# Patient Record
Sex: MALE | Race: WHITE
[De-identification: names, ages, dates, MRNs, and addresses within clinical notes are randomized per-mention and may not be internally consistent; named-entity substitution may affect disease eponyms.]

---

## 2019-01-31 NOTE — GHP
[f 
rep st]



                                                       PREOP HISTORY AND 
PHYSICAL 





He will be an a.m. admission for surgery on Wednesday, February 13, 2019.



PROBLEM:  Right hip severe degenerative arthritis.



HISTORY OF PRESENT ILLNESS:  The patient is a 53-year-old man admitted for a 
right hip [place] hip resurfacing arthroplasty.  He has had intermittent 
problems for the past 5 years.  He is now having daily pain and occasional 
night pain.  Walking is painful.  His activities are very limited.  He is using 
ibuprofen.  He has trouble putting on his shoes and socks on the right side.  
He has tried physical therapy, which is not helpful.  He has had 2 cortisone 
injections, which provided brief benefit.  He has also had some low back pain 
and SI joint problems.  He has seen Dr. Galvez at [facility] and Dr. Derick Corcoran 
in [place].  He is not a candidate for hip arthroscopy.  He is admitted for a 
right hip [place] hip resurfacing arthroplasty.



PAST MEDICAL HISTORY:  He has been treated for skin cancer, which was not 
melanoma.  No history of heart disease, stents, DVT, hepatitis, MRSA staph 
infections, sleep apnea, or bleeding problems.



CURRENT MEDICATIONS:  Advil.



DRUG ALLERGIES:  None.



METAL ALLERGY:  None.



LATEX ALLERGY:  None.



SOCIAL HISTORY:  The patient is .  He does not smoke cigarettes and 
occasionally drinks alcohol.  He works as .



FAMILY HISTORY:  Positive for heart disease.



PHYSICAL EXAMINATION:  VITAL SIGNS:  Height 6 feet 3 inches, weight 225 pounds, 
BMI 28.1.  EYES:  Conjunctivae and sclerae are clear.  Pupils are round and 
reactive.  MOUTH:  Good oral hygiene.  No loose teeth.  CHEST:  Clear.  HEART:  
Regular rhythm.  No murmurs.  EXTREMITIES:  Pertinent findings are limited to 
his right hip.  He has full hip extension and 100 degrees of flexion.  External 
rotation 30 degrees.  Internal rotation 0 degrees.  Abduction 30 degrees.  He 
has well-developed thigh and buttocks musculature.



IMAGING:  His films show degenerative arthritis of his right hip.



IMPRESSION ON ADMISSION:  Right hip degenerative arthritis.  He is prepared for 
a right total hip Radha hip resurfacing arthroplasty.  The surgery has 
been described to him, including the risks, complications, expectations and 
recovery time.  I have talked to him about the risk of femoral neck fracture, 
dislocation, infection and sciatic nerve injury.  We have discussed, on 2 
occasions, the issue of elevated metal ions in the blood and in the soft 
tissues around the hip joint.  He understands that he is very young for any 
type of hip arthroplasty and may require revision surgery in his lifetime.  All 
his questions have been answered and he consents to surgery.  



He does not have a primary care doctor.





Job #:  [id number]

MTDD

## 2019-02-13 ENCOUNTER — HOSPITAL ENCOUNTER (INPATIENT)
Dept: HOSPITAL 80 - FSGY | Age: 54
LOS: 1 days | Discharge: HOME | DRG: 470 | End: 2019-02-14
Attending: ORTHOPAEDIC SURGERY | Admitting: ORTHOPAEDIC SURGERY
Payer: COMMERCIAL

## 2019-02-13 DIAGNOSIS — R55: ICD-10-CM

## 2019-02-13 DIAGNOSIS — M16.11: Primary | ICD-10-CM

## 2019-02-13 DIAGNOSIS — Z85.828: ICD-10-CM

## 2019-02-13 PROCEDURE — C1713 ANCHOR/SCREW BN/BN,TIS/BN: HCPCS

## 2019-02-13 PROCEDURE — 0SU90BZ SUPPLEMENT RIGHT HIP JOINT WITH RESURFACING DEVICE, OPEN APPROACH: ICD-10-PCS | Performed by: ORTHOPAEDIC SURGERY

## 2019-02-13 RX ADMIN — FAMOTIDINE SCH MG: 20 TABLET, FILM COATED ORAL at 20:42

## 2019-02-13 RX ADMIN — ACETAMINOPHEN SCH MG: 325 TABLET ORAL at 23:35

## 2019-02-13 RX ADMIN — ACETAMINOPHEN SCH MG: 325 TABLET ORAL at 17:10

## 2019-02-13 RX ADMIN — KETOROLAC TROMETHAMINE SCH MG: 15 INJECTION, SOLUTION INTRAMUSCULAR; INTRAVENOUS at 18:04

## 2019-02-13 RX ADMIN — ASPIRIN SCH MG: 325 TABLET, FILM COATED ORAL at 20:42

## 2019-02-13 RX ADMIN — KETOROLAC TROMETHAMINE SCH: 15 INJECTION, SOLUTION INTRAMUSCULAR; INTRAVENOUS at 19:45

## 2019-02-13 RX ADMIN — DOCUSATE SODIUM AND SENNOSIDES SCH TAB: 50; 8.6 TABLET ORAL at 20:42

## 2019-02-13 RX ADMIN — ACETAMINOPHEN SCH: 325 TABLET ORAL at 19:44

## 2019-02-13 RX ADMIN — KETOROLAC TROMETHAMINE SCH MG: 15 INJECTION, SOLUTION INTRAMUSCULAR; INTRAVENOUS at 23:34

## 2019-02-13 NOTE — GOP
[f rep st]



                                                                OPERATIVE REPORT





DATE OF OPERATION:  02/13/2019



SURGEON:  Saúl Franco MD



ASSISTANT:  Miguel Aragon CFA and Art Weston PAC.



ANESTHESIA:  A combination of Marcaine, spinal, and IV sedation.



ANESTHESIOLOGIST:  Dr. Oswaldo Garcia.



PREOPERATIVE DIAGNOSIS:  Right hip severe degenerative arthritis.



POSTOPERATIVE DIAGNOSIS:  Right hip severe degenerative arthritis.



PROCEDURE PERFORMED:  02/13/2019 a right hip Hope hip resurfacing arthroplasty.



FINDINGS:  





DESCRIPTION OF PROCEDURE:  The patient was given 2 g of IV Ancef preoperatively within 60 minutes of 
surgery.  He also received 1000 mg of IV tranexamic acid.  He was placed on the operating room table 
and given spinal anesthesia with Marcaine by Dr. Garcia.  He was then placed supine and given IV indira
tion.  A Chaudhary catheter was not used.  He wore a compressive stocking and SCD on the nonoperative leg
.  He was rolled to the left lateral decubitus position.  An axillary roll was used, and all pressure
 points were carefully padded.  The position was secured with the pegboard table attachment.  I was c
areful to lock the pelvis in a vertical position.  His perineum was isolated with plastic adhesive dr bhagat.  His right hip and right lower extremity were prepped with ChloraPrep.  They were draped free u
sing sterile sheets, stockinette, and Ioban plastic drape.  He was a large, muscular man and made pos
itioning a bit difficult. 



The [employer] time-out was performed to verify the correct patient identity and the c
orrect surgical side and site.  The [place] time-out was also performed. 



I made a 7 inch straight oblique posterolateral hip skin incision.  The subcutaneous tissues were sha
rply divided, and hemostasis was obtained using electrocautery.  The fascia jean paul was identified and s
plit along the axis of its fibers.  I curved posteriorly and proximally, and split the fascia of the 
gluteus quan and bluntly split the muscle fibers in line with their orientation.  His sciatic nerv
e was identified and protected throughout the procedure.  The Charnley self-retaining retractor was i
nserted.  The external rotators and the posterior hip capsule were divided as separate layers at the 
base of the femoral neck, tagged, and reflected posteriorly.  His gluteus quan tendon was divided 
and tagged in order to improve exposure and release tension on the sciatic nerve.  His hip was disloc
ated posteriorly.  I used a sizing gauge to check the diameter of the neck and concluded that 52 mm w
as the proper head size.  I performed a circumferential capsulotomy.  I was able to retract the femor
al head anteriorly and superiorly, and hold it out of place with appropriate retractors.  He had very
 muscular buttocks and it made the exposure difficult.  The remnant of his labrum was excised.  His l
abrum was pretty much osteophyed.  He had a large circumferential osteophyte around his acetabulum.  
His acetabulum was reamed sequentially up to 58 mm.  I selected the Hope monoblock porous-coate
d acetabular component with an outside diameter of 58 mm.  This was firmly impacted and was a very ti
ght fit.  I was careful to determine proper inclination and anteversion.  I used the remnant of the t
ransverse acetabular ligament and other acetabular bony landmarks to help me determine proper cup lluvia
entation.  Large periacetabular osteophytes were removed with an osteotome and rongeur.  I was carefu
l to leave a good lip of bone and capsule extending beyond the anterior-inferior lip of the metal cup
. 



I then returned to preparation of the femoral head.  Using appropriate jigs and guides, I inserted a 
guide pin into the femoral head and neck.  I was careful to position it in such a way that there woul
d be no notching of the neck.  The large sterile metal goniometer was used to check the neck shaft an
gle.  I reamed over the guide pin and inserted the reaming guide.  I then used the cylindrical reamer
 down to the head and neck junction.  This was followed by the flat reamer and the chamfer reamer.  T
anna head was sized for 52 mm.  There was no impingement or damage to the neck.  He had some small ante
rior neck osteophytes, which I removed with a rongeur.  I drilled a small hole in the lesser trochant
er and inserted a suction cannula to create negative pressure in the medullary canal.  Small holes we
re drilled on the flattened chamfer surfaces of the prepared head for cement anchors.  The head was t
horoughly cleaned with the pulsating lavage and carefully dried.  I used a CarboJet device to blow dr
y the cancellous surfaces.  A single batch of Simplex cement with tobramycin was mixed.  At about 50 
seconds, I poured the liquid cement into the head component, inserted it onto the femoral head and im
pacted it into place.  Excess cement was removed before it hardened. 



The acetabulum was irrigated and cleaned and inspected, and the hip was reduced.  Stability and range
 of motion were checked.  I placed my finger along the anterior aspect of the acetabular component an
d flexed the hip to 110 degrees.  There was no anterior impingement.  The suction cannula on the less
er trochanter was removed.  The wound was thoroughly irrigated with a dilute Betadine solution.  40 c
c of the joint anesthetic cocktail were injected into the capsule, the deep musculature, and the subc
utaneous tissues along the skin edges.  50 cc of tranexamic acid solution was irrigated into the join
t. 



His sciatic nerve was reinspected and looked unharmed.  The external rotators and the posterior capsu
le were repaired in separate layers with two #2 FiberWire sutures through drill holes in the greater 
trochanter.  The gluteus quan tendon was repaired with two figure-of-eight #2 FiberWire sutures.  
The fascia was repaired first with 2 interrupted figure-of-eight #2 FiberWire sutures followed by a r
unning #2 barbed Ethicon Stratafix PDO suture.  The subcutaneous tissues were closed with a running 0
 barbed Ethicon Stratafix Monoderm suture.  The skin was closed with a running 3-0 barbed Ethicon Str
atafix Monoderm subcuticular suture.  The skin edges were reapproximated and sealed with Dermabond gl
ue.  The wound was covered with a large sterile Mepilex waterproof dressing.  The sacral Mepilex dres
sing was also applied. 



The estimated blood loss was about 500 mL. 



I used a [employer] Hope hip resurfacing system.  The acetabular component was 58 mm in d
iameter and press-fit.  The femoral head was 52 mm and cemented.  A long leg compressive stocking and
 SCD were applied to the operative leg.  An abduction pillow was placed between his knees.  He was aw
akened from anesthesia and rolled to the supine position on his [facility].  He was taken to PAC
U in satisfactory condition.  There were no recognized intraoperative complications. 



The sponge and needle count were correct on 2 occasions. 



Miguel Aragon and Delfino Weston acted as surgical assistants.  Their assistance was a medical necess
ity for safe completion of the procedure. 



The patient does not have a primary care doctor.





Job #:  [id number]

## 2019-02-13 NOTE — PDANEPAE
ANE History of Present Illness





here for R hip re-surfacing





ANE Past Medical History





- Cardiovascular History


Hx Hypertension: No


Hx Arrhythmias: No


Hx Chest Pain: No


Hx Coronary Artery / Peripheral Vascular Disease: No


Hx CHF / Valvular Disease: No


Hx Palpitations: No


Cardiovascular History Comment: MILD MURMUR IN PAST





- Pulmonary History


Hx COPD: No


Hx Asthma/Reactive Airway Disease: No


Hx Recent Upper Respiratory Infection: No


Hx Oxygen in Use at Home: No


Hx Sleep Apnea: No


Sleep Apnea Screening Result - Last Documented: Negative





- Neurologic History


Hx Cerebrovascular Accident: No


Hx Seizures: No


Hx Dementia: No


Neurologic History Comment: OCCAS HEADACHES





- Endocrine History


Hx Diabetes: No





- Renal History


Hx Renal Disorders: No





- Liver History


Hx Hepatic Disorders: No





- Neurological & Psychiatric Hx


Hx Neurological and Psychiatric Disorders: No





- Cancer History


Hx Cancer: Yes


Cancer History Comment: BASAL & SQUAMOUS CELL REMOVED





- Congenital Disorder History


Hx Congenital Disorders: No





- GI History


Hx Gastrointestinal Disorders: No





- Other Health History


Other Health History: DRY SKIN





- Chronic Pain History


Chronic Pain: Yes (R HIP & LOW BACK)





- Surgical History


Prior Surgeries: SKIN CA REMOVED.  KNEE SCOPE R.  HERNIA.  TONSILLECTOMY





ANE Review of Systems


Review of Systems: 








- Exercise capacity


Exercise capacity: >=4 METS


METS (RN): 4 METS





ANE Patient History





- Allergies


Allergies/Adverse Reactions: 








No Known Allergies Allergy (Unverified 01/30/19 11:13)


 








- Home Medications


Home medications: home medication list seen and reviewed


Home Medications: 








Advil  01/30/19 [Last Taken 1 Week Ago ~02/06/19]


Excedrin Tablet (*)  01/30/19 [Last Taken 1 Week Ago ~02/06/19]


Tylenol  02/13/19 [Last Taken 02/09/19]








- NPO status


NPO Status: no food or drink >8 hours


NPO Since - Liquids (Date): 02/13/19


NPO Since - Liquids (Time): 02:00


NPO Since - Solids (Date): 02/12/19


NPO Since - Solids (Time): 21:00





- Anes Hx


Anes Hx: no prior problems





- Smoking Hx


Smoking Status: Never smoked





ANE Labs/Vital Signs





- Vital Signs


Vital Signs: reviewed preoperatively; see RN documention for details


Blood Pressure: 137/100


Heart Rate: 68


Respiratory Rate: 18


O2 Sat (%): 94


Height: 190.5 cm


Weight: 102.058 kg





ANE Physical Exam





- Airway


Neck exam: FROM


Mallampati Score: Class 1





- Pulmonary


Pulmonary: no respiratory distress





- Cardiovascular


Cardiovascular: regular rate and rhythym





- ASA Status


ASA Status: II





ANE Anesthesia Plan


Anesthesia Plan: MAC, spinal

## 2019-02-13 NOTE — POSTOPPROG
Post Op Note


Date of Operation: 02/13/19


Surgeon: Saúl Franco


Assistant: Rosmery


Anesthesiologist: Dr. Oswaldo Garcia


Anesthesia: IV Sedation, Spinal


Post-op Diagnosis: Right hip severe degenerative arthritis.


Procedure: Right hip Radha hip resurfacing arthroplasty.


Inf/Abcess present in the surg proc area at time of surgery?: No


EBL: 100-500

## 2019-02-14 VITALS — DIASTOLIC BLOOD PRESSURE: 71 MMHG | SYSTOLIC BLOOD PRESSURE: 126 MMHG

## 2019-02-14 RX ADMIN — ASPIRIN SCH: 325 TABLET, FILM COATED ORAL at 08:55

## 2019-02-14 RX ADMIN — KETOROLAC TROMETHAMINE SCH MG: 15 INJECTION, SOLUTION INTRAMUSCULAR; INTRAVENOUS at 05:39

## 2019-02-14 RX ADMIN — DOCUSATE SODIUM AND SENNOSIDES SCH: 50; 8.6 TABLET ORAL at 08:56

## 2019-02-14 RX ADMIN — FAMOTIDINE SCH: 20 TABLET, FILM COATED ORAL at 08:56

## 2019-02-14 RX ADMIN — ACETAMINOPHEN SCH MG: 325 TABLET ORAL at 05:39

## 2019-02-14 NOTE — GDS
[f rep st]



                                                             DISCHARGE SUMMARY





ADMISSION DIAGNOSIS:  Right hip severe degenerative arthritis.



DISCHARGE DIAGNOSIS:  Right hip severe degenerative arthritis.



OPERATION PERFORMED:  02/13/2019, a right hip Radha hip resurfacing arthroplasty.



POSTOPERATIVE COMPLICATIONS:  None.



CONDITION ON DISCHARGE:  Improved.



DESCRIPTION OF HOSPITAL COURSE:  The patient was admitted to the hospital on the morning of surgery. 
 His admission CBC was normal.  The same day, under a combination of Marcaine, spinal and IV sedation
, he underwent a right hip Radha hip resurfacing arthroplasty.  Originally he was scheduled to b
e an outpatient.  However, he made slow recovery in PACU and he had a syncopal episode.  This require
d overnight hospitalization.  He was able to void spontaneously.  There were no other postoperative c
omplications.  He was seen by Physical Therapy on the first postoperative day, and subsequently disch
arged.



DISPOSITION:  The patient discharged to his home.  He may progress to full weightbearing as tolerated
.  Use an abduction pillow in bed for 3 weeks.  Use T.E.D. stockings for 1 week.  He has prescription
s for oxycodone, tramadol and Celebrex for pain control.  Continue aspirin 325 mg p.o. daily for 21 d
ays.  He is going to go to Outpatient Physical Therapy.  I will see him back in the office on 03/07/2
019.  If there any problems, he is to call me at the office. 



The patient does not have a primary care doctor.





Job #:  [id number]

## 2019-02-14 NOTE — SOAPPROG
SOAP Progress Note


Assessment/Plan: 


Assessment:





Afebrile.  Mild pain.


Had syncopal episode in PACU and had to be admitted overnight.


Voiding.


Sciatic nerve intact.


Films look good.




















Plan:


Up with PT today.


DC later today.


02/14/19 07:24





Objective: 





 Vital Signs











Temp Pulse Resp BP Pulse Ox


 


 37.0 C   75   16   126/71 H  93 


 


 02/14/19 07:15  02/14/19 07:15  02/14/19 07:15  02/14/19 07:15  02/14/19 07:15








 Laboratory Results





 02/14/19 04:50 





 











 02/13/19 02/14/19 02/15/19





 05:59 05:59 05:59


 


Intake Total  1915 


 


Output Total  500 


 


Balance  1415 














ICD10 Worksheet


Patient Problems: 


 Problems











Problem Status Onset


 


Osteoarthritis of right hip Acute

## 2024-02-06 ENCOUNTER — APPOINTMENT (OUTPATIENT)
Age: 59
Setting detail: DERMATOLOGY
End: 2024-02-06

## 2024-02-06 DIAGNOSIS — L82.1 OTHER SEBORRHEIC KERATOSIS: ICD-10-CM

## 2024-02-06 DIAGNOSIS — L57.0 ACTINIC KERATOSIS: ICD-10-CM

## 2024-02-06 DIAGNOSIS — Z85.828 PERSONAL HISTORY OF OTHER MALIGNANT NEOPLASM OF SKIN: ICD-10-CM

## 2024-02-06 PROCEDURE — ? LIQUID NITROGEN

## 2024-02-06 PROCEDURE — ? COUNSELING

## 2024-02-06 ASSESSMENT — LOCATION SIMPLE DESCRIPTION DERM
LOCATION SIMPLE: LEFT CHEEK
LOCATION SIMPLE: RIGHT OCCIPITAL SCALP
LOCATION SIMPLE: RIGHT CHEEK
LOCATION SIMPLE: LEFT FOREARM
LOCATION SIMPLE: RIGHT ZYGOMA

## 2024-02-06 ASSESSMENT — LOCATION ZONE DERM
LOCATION ZONE: FACE
LOCATION ZONE: ARM
LOCATION ZONE: SCALP

## 2024-02-06 ASSESSMENT — LOCATION DETAILED DESCRIPTION DERM
LOCATION DETAILED: LEFT LATERAL BUCCAL CHEEK
LOCATION DETAILED: RIGHT SUPERIOR LATERAL MALAR CHEEK
LOCATION DETAILED: LEFT DISTAL ULNAR DORSAL FOREARM
LOCATION DETAILED: RIGHT SUPERIOR OCCIPITAL SCALP
LOCATION DETAILED: LEFT MEDIAL MALAR CHEEK
LOCATION DETAILED: RIGHT MEDIAL ZYGOMA
LOCATION DETAILED: RIGHT INFERIOR PREAURICULAR CHEEK

## 2024-06-18 ENCOUNTER — APPOINTMENT (OUTPATIENT)
Age: 59
Setting detail: DERMATOLOGY
End: 2024-06-18

## 2024-06-18 DIAGNOSIS — Z85.828 PERSONAL HISTORY OF OTHER MALIGNANT NEOPLASM OF SKIN: ICD-10-CM

## 2024-06-18 DIAGNOSIS — L57.8 OTHER SKIN CHANGES DUE TO CHRONIC EXPOSURE TO NONIONIZING RADIATION: ICD-10-CM

## 2024-06-18 DIAGNOSIS — L82.1 OTHER SEBORRHEIC KERATOSIS: ICD-10-CM

## 2024-06-18 DIAGNOSIS — Z71.89 OTHER SPECIFIED COUNSELING: ICD-10-CM

## 2024-06-18 DIAGNOSIS — D22 MELANOCYTIC NEVI: ICD-10-CM

## 2024-06-18 DIAGNOSIS — L91.8 OTHER HYPERTROPHIC DISORDERS OF THE SKIN: ICD-10-CM

## 2024-06-18 DIAGNOSIS — D18.0 HEMANGIOMA: ICD-10-CM

## 2024-06-18 DIAGNOSIS — L57.0 ACTINIC KERATOSIS: ICD-10-CM

## 2024-06-18 PROBLEM — D18.01 HEMANGIOMA OF SKIN AND SUBCUTANEOUS TISSUE: Status: ACTIVE | Noted: 2024-06-18

## 2024-06-18 PROBLEM — D22.5 MELANOCYTIC NEVI OF TRUNK: Status: ACTIVE | Noted: 2024-06-18

## 2024-06-18 PROCEDURE — ? VITAMIN B3 COUNSELING

## 2024-06-18 PROCEDURE — ? COUNSELING

## 2024-06-18 PROCEDURE — ? LIQUID NITROGEN

## 2024-06-18 ASSESSMENT — LOCATION DETAILED DESCRIPTION DERM
LOCATION DETAILED: LEFT BUTTOCK
LOCATION DETAILED: LEFT SUPERIOR HELIX
LOCATION DETAILED: LEFT RADIAL DORSAL HAND
LOCATION DETAILED: RIGHT POSTERIOR SHOULDER
LOCATION DETAILED: STERNAL NOTCH
LOCATION DETAILED: STERNUM
LOCATION DETAILED: PERIUMBILICAL SKIN
LOCATION DETAILED: RIGHT SUPERIOR HELIX
LOCATION DETAILED: RIGHT PROXIMAL DORSAL FOREARM
LOCATION DETAILED: LEFT PROXIMAL DORSAL FOREARM
LOCATION DETAILED: SUPERIOR LUMBAR SPINE
LOCATION DETAILED: NASAL DORSUM
LOCATION DETAILED: INFERIOR MID FOREHEAD

## 2024-06-18 ASSESSMENT — LOCATION SIMPLE DESCRIPTION DERM
LOCATION SIMPLE: RIGHT SHOULDER
LOCATION SIMPLE: RIGHT FOREARM
LOCATION SIMPLE: LOWER BACK
LOCATION SIMPLE: NOSE
LOCATION SIMPLE: LEFT HAND
LOCATION SIMPLE: LEFT FOREARM
LOCATION SIMPLE: CHEST
LOCATION SIMPLE: INFERIOR FOREHEAD
LOCATION SIMPLE: ABDOMEN
LOCATION SIMPLE: LEFT BUTTOCK
LOCATION SIMPLE: RIGHT EAR
LOCATION SIMPLE: LEFT EAR

## 2024-06-18 ASSESSMENT — LOCATION ZONE DERM
LOCATION ZONE: FACE
LOCATION ZONE: HAND
LOCATION ZONE: EAR
LOCATION ZONE: TRUNK
LOCATION ZONE: ARM
LOCATION ZONE: NOSE

## 2024-11-07 ENCOUNTER — APPOINTMENT (OUTPATIENT)
Age: 59
Setting detail: DERMATOLOGY
End: 2024-11-07

## 2024-11-07 DIAGNOSIS — D18.0 HEMANGIOMA: ICD-10-CM

## 2024-11-07 DIAGNOSIS — L57.8 OTHER SKIN CHANGES DUE TO CHRONIC EXPOSURE TO NONIONIZING RADIATION: ICD-10-CM

## 2024-11-07 DIAGNOSIS — Z85.828 PERSONAL HISTORY OF OTHER MALIGNANT NEOPLASM OF SKIN: ICD-10-CM

## 2024-11-07 DIAGNOSIS — L82.1 OTHER SEBORRHEIC KERATOSIS: ICD-10-CM

## 2024-11-07 DIAGNOSIS — D22 MELANOCYTIC NEVI: ICD-10-CM

## 2024-11-07 DIAGNOSIS — D485 NEOPLASM OF UNCERTAIN BEHAVIOR OF SKIN: ICD-10-CM

## 2024-11-07 DIAGNOSIS — Z71.89 OTHER SPECIFIED COUNSELING: ICD-10-CM

## 2024-11-07 PROBLEM — D22.5 MELANOCYTIC NEVI OF TRUNK: Status: ACTIVE | Noted: 2024-11-07

## 2024-11-07 PROBLEM — D48.5 NEOPLASM OF UNCERTAIN BEHAVIOR OF SKIN: Status: ACTIVE | Noted: 2024-11-07

## 2024-11-07 PROBLEM — D18.01 HEMANGIOMA OF SKIN AND SUBCUTANEOUS TISSUE: Status: ACTIVE | Noted: 2024-11-07

## 2024-11-07 PROCEDURE — ? COUNSELING

## 2024-11-07 PROCEDURE — ? BIOPSY BY SHAVE METHOD

## 2024-11-07 ASSESSMENT — LOCATION SIMPLE DESCRIPTION DERM
LOCATION SIMPLE: ABDOMEN
LOCATION SIMPLE: RIGHT UPPER BACK
LOCATION SIMPLE: LEFT POSTAURICULAR SKIN
LOCATION SIMPLE: RIGHT FOREARM
LOCATION SIMPLE: INFERIOR FOREHEAD
LOCATION SIMPLE: LOWER BACK
LOCATION SIMPLE: NOSE
LOCATION SIMPLE: CHEST
LOCATION SIMPLE: LEFT SHOULDER
LOCATION SIMPLE: RIGHT SHOULDER
LOCATION SIMPLE: LEFT FOREARM

## 2024-11-07 ASSESSMENT — LOCATION ZONE DERM
LOCATION ZONE: TRUNK
LOCATION ZONE: NOSE
LOCATION ZONE: FACE
LOCATION ZONE: SCALP
LOCATION ZONE: ARM

## 2024-11-07 ASSESSMENT — LOCATION DETAILED DESCRIPTION DERM
LOCATION DETAILED: STERNUM
LOCATION DETAILED: LEFT CENTRAL POSTAURICULAR SKIN
LOCATION DETAILED: RIGHT POSTERIOR SHOULDER
LOCATION DETAILED: RIGHT PROXIMAL DORSAL FOREARM
LOCATION DETAILED: RIGHT MID-UPPER BACK
LOCATION DETAILED: STERNAL NOTCH
LOCATION DETAILED: NASAL DORSUM
LOCATION DETAILED: LEFT PROXIMAL DORSAL FOREARM
LOCATION DETAILED: SUPERIOR LUMBAR SPINE
LOCATION DETAILED: LEFT RIB CAGE
LOCATION DETAILED: INFERIOR MID FOREHEAD
LOCATION DETAILED: PERIUMBILICAL SKIN
LOCATION DETAILED: LEFT POSTERIOR SHOULDER

## 2024-12-11 ENCOUNTER — APPOINTMENT (OUTPATIENT)
Age: 59
Setting detail: DERMATOLOGY
End: 2024-12-11

## 2024-12-11 DIAGNOSIS — L57.0 ACTINIC KERATOSIS: ICD-10-CM

## 2024-12-11 DIAGNOSIS — Z85.828 PERSONAL HISTORY OF OTHER MALIGNANT NEOPLASM OF SKIN: ICD-10-CM

## 2024-12-11 DIAGNOSIS — L84 CORNS AND CALLOSITIES: ICD-10-CM

## 2024-12-11 PROBLEM — C44.619 BASAL CELL CARCINOMA OF SKIN OF LEFT UPPER LIMB, INCLUDING SHOULDER: Status: ACTIVE | Noted: 2024-12-11

## 2024-12-11 PROBLEM — C44.519 BASAL CELL CARCINOMA OF SKIN OF OTHER PART OF TRUNK: Status: ACTIVE | Noted: 2024-12-11

## 2024-12-11 PROBLEM — C44.612 BASAL CELL CARCINOMA OF SKIN OF RIGHT UPPER LIMB, INCLUDING SHOULDER: Status: ACTIVE | Noted: 2024-12-11

## 2024-12-11 PROCEDURE — ? COUNSELING

## 2024-12-11 PROCEDURE — ? LIQUID NITROGEN

## 2024-12-11 PROCEDURE — ? CURETTAGE AND DESTRUCTION

## 2024-12-11 ASSESSMENT — LOCATION DETAILED DESCRIPTION DERM
LOCATION DETAILED: RIGHT CENTRAL TEMPLE
LOCATION DETAILED: LEFT ULNAR DORSAL HAND

## 2024-12-11 ASSESSMENT — LOCATION SIMPLE DESCRIPTION DERM
LOCATION SIMPLE: LEFT HAND
LOCATION SIMPLE: RIGHT TEMPLE

## 2024-12-11 ASSESSMENT — LOCATION ZONE DERM
LOCATION ZONE: HAND
LOCATION ZONE: FACE

## 2025-05-06 ENCOUNTER — APPOINTMENT (OUTPATIENT)
Age: 60
Setting detail: DERMATOLOGY
End: 2025-05-06

## 2025-05-06 DIAGNOSIS — D485 NEOPLASM OF UNCERTAIN BEHAVIOR OF SKIN: ICD-10-CM

## 2025-05-06 DIAGNOSIS — D22 MELANOCYTIC NEVI: ICD-10-CM

## 2025-05-06 DIAGNOSIS — Z71.89 OTHER SPECIFIED COUNSELING: ICD-10-CM

## 2025-05-06 DIAGNOSIS — D18.0 HEMANGIOMA: ICD-10-CM

## 2025-05-06 DIAGNOSIS — L82.1 OTHER SEBORRHEIC KERATOSIS: ICD-10-CM

## 2025-05-06 DIAGNOSIS — L57.8 OTHER SKIN CHANGES DUE TO CHRONIC EXPOSURE TO NONIONIZING RADIATION: ICD-10-CM

## 2025-05-06 DIAGNOSIS — L57.0 ACTINIC KERATOSIS: ICD-10-CM

## 2025-05-06 DIAGNOSIS — Z85.828 PERSONAL HISTORY OF OTHER MALIGNANT NEOPLASM OF SKIN: ICD-10-CM

## 2025-05-06 PROBLEM — D48.5 NEOPLASM OF UNCERTAIN BEHAVIOR OF SKIN: Status: ACTIVE | Noted: 2025-05-06

## 2025-05-06 PROBLEM — D22.5 MELANOCYTIC NEVI OF TRUNK: Status: ACTIVE | Noted: 2025-05-06

## 2025-05-06 PROBLEM — D18.01 HEMANGIOMA OF SKIN AND SUBCUTANEOUS TISSUE: Status: ACTIVE | Noted: 2025-05-06

## 2025-05-06 PROCEDURE — ? BIOPSY BY SHAVE METHOD

## 2025-05-06 PROCEDURE — ? LIQUID NITROGEN

## 2025-05-06 PROCEDURE — ? COUNSELING

## 2025-05-06 ASSESSMENT — LOCATION DETAILED DESCRIPTION DERM
LOCATION DETAILED: INFERIOR MID FOREHEAD
LOCATION DETAILED: SUPERIOR LUMBAR SPINE
LOCATION DETAILED: STERNAL NOTCH
LOCATION DETAILED: LEFT PROXIMAL DORSAL FOREARM
LOCATION DETAILED: STERNUM
LOCATION DETAILED: LEFT NASAL SIDEWALL
LOCATION DETAILED: NASAL DORSUM
LOCATION DETAILED: PERIUMBILICAL SKIN
LOCATION DETAILED: LEFT INFERIOR POSTERIOR NECK
LOCATION DETAILED: LEFT LATERAL PROXIMAL UPPER ARM
LOCATION DETAILED: LEFT INFERIOR LATERAL MALAR CHEEK
LOCATION DETAILED: RIGHT PROXIMAL DORSAL FOREARM
LOCATION DETAILED: LEFT SUPERIOR PREAURICULAR CHEEK

## 2025-05-06 ASSESSMENT — LOCATION ZONE DERM
LOCATION ZONE: NECK
LOCATION ZONE: ARM
LOCATION ZONE: NOSE
LOCATION ZONE: TRUNK
LOCATION ZONE: FACE

## 2025-05-06 ASSESSMENT — LOCATION SIMPLE DESCRIPTION DERM
LOCATION SIMPLE: NECK
LOCATION SIMPLE: INFERIOR FOREHEAD
LOCATION SIMPLE: LEFT CHEEK
LOCATION SIMPLE: LEFT UPPER ARM
LOCATION SIMPLE: LOWER BACK
LOCATION SIMPLE: RIGHT FOREARM
LOCATION SIMPLE: LEFT FOREARM
LOCATION SIMPLE: ABDOMEN
LOCATION SIMPLE: LEFT NOSE
LOCATION SIMPLE: NOSE
LOCATION SIMPLE: CHEST

## 2025-05-22 ENCOUNTER — APPOINTMENT (OUTPATIENT)
Age: 60
Setting detail: DERMATOLOGY
End: 2025-05-22

## 2025-05-22 DIAGNOSIS — L57.0 ACTINIC KERATOSIS: ICD-10-CM

## 2025-05-22 PROBLEM — C44.41 BASAL CELL CARCINOMA OF SKIN OF SCALP AND NECK: Status: ACTIVE | Noted: 2025-05-22

## 2025-05-22 PROBLEM — C44.619 BASAL CELL CARCINOMA OF SKIN OF LEFT UPPER LIMB, INCLUDING SHOULDER: Status: ACTIVE | Noted: 2025-05-22

## 2025-05-22 PROCEDURE — ? CURETTAGE AND DESTRUCTION

## 2025-05-22 PROCEDURE — ? LIQUID NITROGEN

## 2025-05-22 PROCEDURE — ? COUNSELING

## 2025-05-22 ASSESSMENT — LOCATION DETAILED DESCRIPTION DERM: LOCATION DETAILED: RIGHT DISTAL DORSAL FOREARM

## 2025-05-22 ASSESSMENT — LOCATION ZONE DERM: LOCATION ZONE: ARM

## 2025-05-22 ASSESSMENT — LOCATION SIMPLE DESCRIPTION DERM: LOCATION SIMPLE: RIGHT FOREARM

## 2025-06-26 ENCOUNTER — APPOINTMENT (OUTPATIENT)
Age: 60
Setting detail: DERMATOLOGY
End: 2025-06-26

## 2025-06-26 DIAGNOSIS — Z85.828 PERSONAL HISTORY OF OTHER MALIGNANT NEOPLASM OF SKIN: ICD-10-CM

## 2025-06-26 DIAGNOSIS — D485 NEOPLASM OF UNCERTAIN BEHAVIOR OF SKIN: ICD-10-CM

## 2025-06-26 DIAGNOSIS — L57.0 ACTINIC KERATOSIS: ICD-10-CM

## 2025-06-26 PROBLEM — D48.5 NEOPLASM OF UNCERTAIN BEHAVIOR OF SKIN: Status: ACTIVE | Noted: 2025-06-26

## 2025-06-26 PROCEDURE — ? LIQUID NITROGEN

## 2025-06-26 PROCEDURE — ? COUNSELING

## 2025-06-26 PROCEDURE — ? BIOPSY BY SHAVE METHOD

## 2025-06-26 ASSESSMENT — LOCATION DETAILED DESCRIPTION DERM
LOCATION DETAILED: RIGHT DISTAL POSTERIOR UPPER ARM
LOCATION DETAILED: LEFT ANTERIOR SHOULDER
LOCATION DETAILED: LEFT CLAVICULAR NECK
LOCATION DETAILED: RIGHT SUPERIOR CRUS OF ANTIHELIX
LOCATION DETAILED: RIGHT INFERIOR MEDIAL FOREHEAD

## 2025-06-26 ASSESSMENT — LOCATION SIMPLE DESCRIPTION DERM
LOCATION SIMPLE: RIGHT EAR
LOCATION SIMPLE: RIGHT FOREHEAD
LOCATION SIMPLE: LEFT SHOULDER
LOCATION SIMPLE: LEFT ANTERIOR NECK
LOCATION SIMPLE: RIGHT UPPER ARM

## 2025-06-26 ASSESSMENT — LOCATION ZONE DERM
LOCATION ZONE: NECK
LOCATION ZONE: ARM
LOCATION ZONE: FACE
LOCATION ZONE: EAR